# Patient Record
Sex: MALE | Race: WHITE | NOT HISPANIC OR LATINO
[De-identification: names, ages, dates, MRNs, and addresses within clinical notes are randomized per-mention and may not be internally consistent; named-entity substitution may affect disease eponyms.]

---

## 2022-05-29 ENCOUNTER — HEALTH MAINTENANCE LETTER (OUTPATIENT)
Age: 32
End: 2022-05-29

## 2022-06-09 ENCOUNTER — OFFICE VISIT (OUTPATIENT)
Dept: URGENT CARE | Facility: URGENT CARE | Age: 32
End: 2022-06-09
Payer: COMMERCIAL

## 2022-06-09 ENCOUNTER — NURSE TRIAGE (OUTPATIENT)
Dept: NURSING | Facility: CLINIC | Age: 32
End: 2022-06-09
Payer: COMMERCIAL

## 2022-06-09 VITALS
SYSTOLIC BLOOD PRESSURE: 119 MMHG | DIASTOLIC BLOOD PRESSURE: 80 MMHG | RESPIRATION RATE: 16 BRPM | WEIGHT: 190 LBS | OXYGEN SATURATION: 100 % | HEART RATE: 63 BPM

## 2022-06-09 DIAGNOSIS — R21 RASH OF HAND: Primary | ICD-10-CM

## 2022-06-09 PROCEDURE — 99203 OFFICE O/P NEW LOW 30 MIN: CPT | Performed by: NURSE PRACTITIONER

## 2022-06-09 RX ORDER — DOXYCYCLINE 100 MG/1
100 TABLET ORAL 2 TIMES DAILY
Qty: 20 TABLET | Refills: 0 | Status: SHIPPED | OUTPATIENT
Start: 2022-06-09 | End: 2022-06-19

## 2022-06-09 RX ORDER — TRIAMCINOLONE ACETONIDE 1 MG/G
CREAM TOPICAL 3 TIMES DAILY
Qty: 15 G | Refills: 0 | Status: SHIPPED | OUTPATIENT
Start: 2022-06-09 | End: 2022-06-16

## 2022-06-09 NOTE — TELEPHONE ENCOUNTER
Patient is calling and has an irritation on top of his hand rash is present on top of right hand.  Cracks and bumps present and is getting worse.  Rash started one month ago.  Almost went away and then comes back.  Size of silver dollar.  Patient denies fever cough and shortness of breath.  Patient states that he will go to walk in clinic today.  Denies that rash is very painful.  Denies purple in color.  Rash has been present greater than 7 days.    COVID 19 Nurse Triage Plan/Patient Instructions    Please be aware that novel coronavirus (COVID-19) may be circulating in the community. If you develop symptoms such as fever, cough, or SOB or if you have concerns about the presence of another infection including coronavirus (COVID-19), please contact your health care provider or visit [email or web address].     Disposition/Instructions    In-Person Visit with provider recommended. Reference Visit Selection Guide.    Thank you for taking steps to prevent the spread of this virus.  o Limit your contact with others.  o Wear a simple mask to cover your cough.  o Wash your hands well and often.    Resources    [facility]: About COVID-19: [email or web address]    CDC: What to Do If You're Sick: www.cdc.gov/coronavirus/2019-ncov/about/steps-when-sick.html    CDC: Ending Home Isolation: www.cdc.gov/coronavirus/2019-ncov/hcp/disposition-in-home-patients.html     CDC: Caring for Someone: www.cdc.gov/coronavirus/2019-ncov/if-you-are-sick/care-for-someone.html     [facility]: Interim Guidance for Hospital Discharge to Home: www.health.[facility].mn.us/diseases/coronavirus/hcp/hospdischarge.pdf    [facility] clinical trials (COVID-19 research studies): [email or web address].[email or web address].edu/um-clinical-trials     Below are the COVID-19 hotlines at the [place] Department of [facility] ([facility]). Interpreters are available.   o For health questions: Call 789-772-1791 or 1-514.116.5675 (7 a.m. to 7 p.m.)  o For questions about  schools and childcare: Call 389-411-4065 or 1-465.393.8772 (7 a.m. to 7 p.m.)                       Reason for Disposition    Localized rash present > 7 days    Additional Information    Negative: Sounds like a life-threatening emergency to the triager    Negative: Fever and localized purple or blood-colored spots or dots that are not from injury or friction    Negative: Fever and localized rash is very painful    Negative: Patient sounds very sick or weak to the triager    Negative: Looks like a boil, infected sore, deep ulcer, or other infected rash (spreading redness, pus)    Negative: Painful rash with multiple small blisters grouped together (i.e., dermatomal distribution or 'band' or 'stripe')    Negative: Localized rash is very painful (no fever)    Negative: Localized purple or blood-colored spots or dots that are not from injury or friction (no fever)    Negative: Lyme disease suspected (e.g., bull's-eye rash or tick bite / exposure)    Negative: Patient wants to be seen    Negative: Tender bumps in armpits    Negative: Pimples (localized) and no improvement after using CARE ADVICE    Negative: SEVERE local itching persists after 2 days of steroid cream    Negative: Applying cream or ointment and it causes severe itch, burning, or pain    Protocols used: RASH OR REDNESS - [id number]

## 2022-06-09 NOTE — PROGRESS NOTES
Chief Complaint   Patient presents with     Rash     Pt has a rash on R hand that has been going on for 1 month          ICD-10-CM    1. Rash of hand  R21 doxycycline monohydrate (ADOXA) 100 MG tablet     triamcinolone (KENALOG) 0.1 % external cream     Adult Dermatology Referral   Some concerns about secondary MRSA infection, will treat with doxycycline for this.  We will try prescription steroid on the rash itself and see if this helps to improve symptoms.  Dermatology referral is made if symptoms do not improve with this course of action.      Subjective     Corey Martin is an 32 year old male who presents to clinic today for rash for approximately 1 month on the right hand.  Started out as a few red dots and they eventually became bigger and merged into this larger area.  He has tried over-the-counter hydrocortisone cream and the symptoms nearly all gone but then they started worsening again and now he has slight yellow crusting on some of the areas.  He has felt well otherwise, no fever no upper respiratory symptoms, no exposure to woods or plants that he can recall.        Objective    /80   Pulse 63   Resp 16   Wt 86.2 kg (190 lb)   SpO2 100%   Nurses notes and VS have been reviewed.    Physical Exam     Alert and oriented  Right hand has 2.5 cm diameter rash that appears in a somewhat reticular pattern with several small areas of yellow crusting, rash is raised slightly off of the skin and the entire base of the area has become quite red    Patient Instructions   Do not use Triamcinolone cream longer than 10 days      GABRIEL Steven, CNP  [facility] Provider    The use of Dragon/[employer] dictation services may have been used to construct the content in this note; any grammatical or spelling errors are non-intentional. Please contact the author of this note directly if you are in need of any clarification.

## 2022-06-22 NOTE — PROGRESS NOTES
SUBJECTIVE:   CC: Corey Martni is an 32 year old male who presents for preventative health visit.       Patient has been advised of split billing requirements and indicates understanding: Yes  Healthy Habits:     Getting at least 3 servings of Calcium per day:  Yes    Bi-annual eye exam:  Yes    Dental care twice a year:  NO    Sleep apnea or symptoms of sleep apnea:  Excessive snoring    Diet:  Regular (no restrictions)    Frequency of exercise:  2-3 days/week    Duration of exercise:  15-30 minutes    Taking medications regularly:  Not Applicable    Medication side effects:  Not applicable    PHQ-2 Total Score: 0    Additional concerns today:  Yes        Today's PHQ-2 Score:   PHQ-2 ( 1999 Pfizer) 6/23/2022   Q1: Little interest or pleasure in doing things 0   Q2: Feeling down, depressed or hopeless 0   PHQ-2 Score 0   Q1: Little interest or pleasure in doing things Not at all   Q2: Feeling down, depressed or hopeless Not at all   PHQ-2 Score 0       Abuse: Current or Past(Physical, Sexual or Emotional)- No  Do you feel safe in your environment? Yes    Have you ever done Advance Care Planning? (For example, a Health Directive, POLST, or a discussion with a medical provider or your loved ones about your wishes): No, advance care planning information given to patient to review.  Patient plans to discuss their wishes with loved ones or provider.      Social History     Tobacco Use     Smoking status: Passive Smoke Exposure - Never Smoker     Smokeless tobacco: Current User     Tobacco comment: Use chew tobacco a few times a week for the last ten years. been trying to quit.   Substance Use Topics     Alcohol use: Never     If you drink alcohol do you typically have >3 drinks per day or >7 drinks per week? No    Alcohol Use 6/23/2022   Prescreen: >3 drinks/day or >7 drinks/week? Not Applicable   Prescreen: >3 drinks/day or >7 drinks/week? -       Last PSA: No results found for: PSA    Reviewed orders with patient.  Reviewed health maintenance and updated orders accordingly - Yes  There is no problem list on file for this patient.    Past Surgical History:   Procedure Laterality Date     GENITOURINARY SURGERY  December, 2020    Vasectomy       Social History     Tobacco Use     Smoking status: Passive Smoke Exposure - Never Smoker     Smokeless tobacco: Current User     Tobacco comment: Use chew tobacco a few times a week for the last ten years. been trying to quit.   Substance Use Topics     Alcohol use: Never     Family History   Problem Relation Age of Onset     Diabetes Father      Hypertension Father      Other Cancer Father      Hypertension Sister          Current Outpatient Medications   Medication Sig Dispense Refill     clobetasol (TEMOVATE) 0.05 % external cream Apply topically 2 times daily 60 g 11     Allergies   Allergen Reactions     Sulfamethoxazole W/Trimethoprim      Sulfamethoxazole-Trimethoprim Other (See Comments)       Reviewed and updated as needed this visit by clinical staff   Tobacco   Meds  Problems  Med Hx  Surg Hx  Fam Hx            Reviewed and updated as needed this visit by Provider   Tobacco     Med Hx  Surg Hx  Fam Hx           History reviewed. No pertinent past medical history.   Past Surgical History:   Procedure Laterality Date     GENITOURINARY SURGERY  December, 2020    Vasectomy       Review of Systems   Constitutional: Negative for chills and fever.   HENT: Negative for congestion, ear pain, hearing loss and sore throat.    Eyes: Negative for pain and visual disturbance.   Respiratory: Negative for cough and shortness of breath.    Cardiovascular: Negative for chest pain, palpitations and peripheral edema.   Gastrointestinal: Negative for abdominal pain, constipation, diarrhea, heartburn, hematochezia and nausea.   Genitourinary: Negative for dysuria, frequency, genital sores, hematuria, impotence, penile discharge and urgency.   Musculoskeletal: Negative for arthralgias,  "joint swelling and myalgias.   Skin: Positive for rash.   Neurological: Negative for dizziness, weakness, headaches and paresthesias.   Psychiatric/Behavioral: Negative for mood changes. The patient is not nervous/anxious.    rash on dorsum on right hand treated with antibiotics and triamcinolone cream, but persistent albeit improved     OBJECTIVE:   /77 (BP Location: Left arm, Patient Position: Chair, Cuff Size: Adult Large)   Pulse 76   Temp 97  F (36.1  C) (Tympanic)   Resp 16   Ht 1.689 m (5' 6.5\")   Wt 85.7 kg (189 lb)   SpO2 97%   BMI 30.05 kg/m      Physical Exam  GENERAL: healthy, alert and no distress  EYES: Eyes grossly normal to inspection, PERRL and conjunctivae and sclerae normal  HENT: ear canals and TM's normal, nose and mouth without ulcers or lesions  NECK: no adenopathy, no asymmetry, masses, or scars and thyroid normal to palpation  RESP: lungs clear to auscultation - no rales, rhonchi or wheezes  CV: regular rate and rhythm, normal S1 S2, no S3 or S4, no murmur, click or rub, no peripheral edema and peripheral pulses strong  ABDOMEN: soft, nontender, no hepatosplenomegaly, no masses and bowel sounds normal   (male): normal male genitalia without lesions or urethral discharge, no hernia  MS: no gross musculoskeletal defects noted, no edema  SKIN: erythema on dorsum of right hand   NEURO: Normal strength and tone, mentation intact and speech normal  PSYCH: mentation appears normal, affect normal/bright    Labs pending     ASSESSMENT/PLAN:       ICD-10-CM    1. Routine general medical examination at a health care facility  Z00.00 Lipid panel reflex to direct LDL Fasting     Comprehensive metabolic panel     CBC with platelets   2. Eczema, unspecified type  L30.9 clobetasol (TEMOVATE) 0.05 % external cream   3. Tobacco abuse  Z72.0    4. Screening for HIV (human immunodeficiency virus)  Z11.4 HIV Antigen Antibody Combo   5. Need for hepatitis C screening test  Z11.59 Hepatitis C Screen " "Reflex to HCV RNA Quant and Genotype   6. Need for vaccination  Z23 TD PRSERV FREE >=7 YRS ADS IM [[id number]]     Counseled on quitting chew  Counseled on strategies for weight loss  Rash looks eczematous rather than infectious; recommend higher potency steroid cream ; return if that does not clear the rash up   Patient has been advised of split billing requirements and indicates understanding: Yes    COUNSELING:   Reviewed preventive health counseling, as reflected in patient instructions  Special attention given to:        Regular exercise       Healthy diet/nutrition       Immunizations    Tetanus booster today              Consider Hep C screening for all patients one time for ages 18-79 years       HIV screeninx in teen years, 1x in adult years, and at intervals if high risk    Estimated body mass index is 30.05 kg/m  as calculated from the following:    Height as of this encounter: 1.689 m (5' 6.5\").    Weight as of this encounter: 85.7 kg (189 lb).     Weight management plan: Discussed healthy diet and exercise guidelines    He reports that he is a non-smoker but has been exposed to tobacco smoke. The exposure started about 9 years ago. He has been exposed to 0.00 packs per day for the past 10.00 years. He uses smokeless tobacco.      Counseling Resources:  ATP IV Guidelines  Pooled Cohorts Equation Calculator  FRAX Risk Assessment  ICSI Preventive Guidelines  Dietary Guidelines for Americans,   USDA's MyPlate  ASA Prophylaxis  Lung CA Screening    King Batres MD  [facility]  "

## 2022-06-23 ENCOUNTER — OFFICE VISIT (OUTPATIENT)
Dept: FAMILY MEDICINE | Facility: CLINIC | Age: 32
End: 2022-06-23
Payer: COMMERCIAL

## 2022-06-23 VITALS
DIASTOLIC BLOOD PRESSURE: 77 MMHG | BODY MASS INDEX: 29.66 KG/M2 | OXYGEN SATURATION: 97 % | RESPIRATION RATE: 16 BRPM | SYSTOLIC BLOOD PRESSURE: 130 MMHG | HEIGHT: 67 IN | TEMPERATURE: 97 F | WEIGHT: 189 LBS | HEART RATE: 76 BPM

## 2022-06-23 DIAGNOSIS — Z00.00 ROUTINE GENERAL MEDICAL EXAMINATION AT A HEALTH CARE FACILITY: Primary | ICD-10-CM

## 2022-06-23 DIAGNOSIS — L30.9 ECZEMA, UNSPECIFIED TYPE: ICD-10-CM

## 2022-06-23 DIAGNOSIS — Z11.59 NEED FOR HEPATITIS C SCREENING TEST: ICD-10-CM

## 2022-06-23 DIAGNOSIS — Z11.4 SCREENING FOR HIV (HUMAN IMMUNODEFICIENCY VIRUS): ICD-10-CM

## 2022-06-23 DIAGNOSIS — Z23 NEED FOR VACCINATION: ICD-10-CM

## 2022-06-23 DIAGNOSIS — Z72.0 TOBACCO ABUSE: ICD-10-CM

## 2022-06-23 PROCEDURE — 90714 TD VACC NO PRESV 7 YRS+ IM: CPT | Performed by: INTERNAL MEDICINE

## 2022-06-23 PROCEDURE — 90471 IMMUNIZATION ADMIN: CPT | Performed by: INTERNAL MEDICINE

## 2022-06-23 PROCEDURE — 99395 PREV VISIT EST AGE 18-39: CPT | Mod: 25 | Performed by: INTERNAL MEDICINE

## 2022-06-23 RX ORDER — CLOBETASOL PROPIONATE 0.5 MG/G
CREAM TOPICAL 2 TIMES DAILY
Qty: 60 G | Refills: 11 | Status: SHIPPED | OUTPATIENT
Start: 2022-06-23

## 2022-06-23 ASSESSMENT — ENCOUNTER SYMPTOMS
WEAKNESS: 0
CONSTIPATION: 0
ABDOMINAL PAIN: 0
DYSURIA: 0
ARTHRALGIAS: 0
FEVER: 0
ARTHRALGIAS: 1
PALPITATIONS: 0
CHILLS: 0
FREQUENCY: 0
MYALGIAS: 0
NAUSEA: 0
HEADACHES: 0
SHORTNESS OF BREATH: 0
DIZZINESS: 0
NERVOUS/ANXIOUS: 0
PARESTHESIAS: 0
SORE THROAT: 0
JOINT SWELLING: 0
HEARTBURN: 0
COUGH: 0
HEMATOCHEZIA: 0
DIARRHEA: 0
EYE PAIN: 0
HEMATURIA: 0

## 2022-06-23 ASSESSMENT — PAIN SCALES - GENERAL: PAINLEVEL: NO PAIN (0)

## 2022-06-23 NOTE — NURSING NOTE
Prior to immunization administration, verified patients identity using patient s name and date of birth. Please see Immunization Activity for additional information.     Screening Questionnaire for Adult Immunization    Are you sick today?   No   Do you have allergies to medications, food, a vaccine component or latex?   No   Have you ever had a serious reaction after receiving a vaccination?   No   Do you have a long-term health problem with heart, lung, kidney, or metabolic disease (e.g., diabetes), asthma, a blood disorder, no spleen, complement component deficiency, a cochlear implant, or a spinal fluid leak?  Are you on long-term aspirin therapy?   No   Do you have cancer, leukemia, HIV/AIDS, or any other immune system problem?   No   Do you have a parent, brother, or sister with an immune system problem?   No   In the past 3 months, have you taken medications that affect  your immune system, such as prednisone, other steroids, or anticancer drugs; drugs for the treatment of rheumatoid arthritis, Crohn s disease, or psoriasis; or have you had radiation treatments?   No   Have you had a seizure, or a brain or other nervous system problem?   No   During the past year, have you received a transfusion of blood or blood    products, or been given immune (gamma) globulin or antiviral drug?   No   For women: Are you pregnant or is there a chance you could become       pregnant during the next month?   No   Have you received any vaccinations in the past 4 weeks?   No     Immunization questionnaire answers were all negative.        Per orders of Dr. Batres, injection of TD given by Arianna Lew CMA. Patient instructed to remain in clinic for 15 minutes afterwards, and to report any adverse reaction to me immediately.       Screening performed by Arianna Lwe CMA on 6/23/2022 at 5:13 PM.

## 2022-07-08 ENCOUNTER — LAB (OUTPATIENT)
Dept: LAB | Facility: CLINIC | Age: 32
End: 2022-07-08
Payer: COMMERCIAL

## 2022-07-08 DIAGNOSIS — Z00.00 ROUTINE GENERAL MEDICAL EXAMINATION AT A HEALTH CARE FACILITY: ICD-10-CM

## 2022-07-08 DIAGNOSIS — Z11.4 SCREENING FOR HIV (HUMAN IMMUNODEFICIENCY VIRUS): ICD-10-CM

## 2022-07-08 DIAGNOSIS — Z11.59 NEED FOR HEPATITIS C SCREENING TEST: ICD-10-CM

## 2022-07-08 LAB
ALBUMIN SERPL-MCNC: 4 G/DL (ref 3.4–5)
ALP SERPL-CCNC: 57 U/L (ref 40–150)
ALT SERPL W P-5'-P-CCNC: 40 U/L (ref 0–70)
ANION GAP SERPL CALCULATED.3IONS-SCNC: 5 MMOL/L (ref 3–14)
AST SERPL W P-5'-P-CCNC: 19 U/L (ref 0–45)
BILIRUB SERPL-MCNC: 0.9 MG/DL (ref 0.2–1.3)
BUN SERPL-MCNC: 18 MG/DL (ref 7–30)
CALCIUM SERPL-MCNC: 9.4 MG/DL (ref 8.5–10.1)
CHLORIDE BLD-SCNC: 106 MMOL/L (ref 94–109)
CHOLEST SERPL-MCNC: 202 MG/DL
CO2 SERPL-SCNC: 28 MMOL/L (ref 20–32)
CREAT SERPL-MCNC: 0.9 MG/DL (ref 0.66–1.25)
ERYTHROCYTE [DISTWIDTH] IN BLOOD BY AUTOMATED COUNT: 11.4 % (ref 10–15)
FASTING STATUS PATIENT QL REPORTED: YES
GFR SERPL CREATININE-BSD FRML MDRD: >90 ML/MIN/1.73M2
GLUCOSE BLD-MCNC: 89 MG/DL (ref 70–99)
HCT VFR BLD AUTO: 45.5 % (ref 40–53)
HCV AB SERPL QL IA: NONREACTIVE
HDLC SERPL-MCNC: 56 MG/DL
HGB BLD-MCNC: 15.2 G/DL (ref 13.3–17.7)
HIV 1+2 AB+HIV1 P24 AG SERPL QL IA: NONREACTIVE
LDLC SERPL CALC-MCNC: 129 MG/DL
MCH RBC QN AUTO: 30.7 PG (ref 26.5–33)
MCHC RBC AUTO-ENTMCNC: 33.4 G/DL (ref 31.5–36.5)
MCV RBC AUTO: 92 FL (ref 78–100)
NONHDLC SERPL-MCNC: 146 MG/DL
PLATELET # BLD AUTO: 288 10E3/UL (ref 150–450)
POTASSIUM BLD-SCNC: 4.7 MMOL/L (ref 3.4–5.3)
PROT SERPL-MCNC: 7.8 G/DL (ref 6.8–8.8)
RBC # BLD AUTO: 4.95 10E6/UL (ref 4.4–5.9)
SODIUM SERPL-SCNC: 139 MMOL/L (ref 133–144)
TRIGL SERPL-MCNC: 84 MG/DL
WBC # BLD AUTO: 4.7 10E3/UL (ref 4–11)

## 2022-07-08 PROCEDURE — 86803 HEPATITIS C AB TEST: CPT

## 2022-07-08 PROCEDURE — 85027 COMPLETE CBC AUTOMATED: CPT

## 2022-07-08 PROCEDURE — 80053 COMPREHEN METABOLIC PANEL: CPT

## 2022-07-08 PROCEDURE — 80061 LIPID PANEL: CPT

## 2022-07-08 PROCEDURE — 87389 HIV-1 AG W/HIV-1&-2 AB AG IA: CPT

## 2022-07-08 PROCEDURE — 36415 COLL VENOUS BLD VENIPUNCTURE: CPT

## 2022-07-08 NOTE — RESULT ENCOUNTER NOTE
"The following letter pertains to your most recent diagnostic tests:    -Your HIV test is negative.     -Your hepatitis C screening test is negative.  You do not have hepatitis C.     -Your total cholesterol is 202 which is just above your goal of total cholesterol less than 200.    -Your triglycerides are 84 which are at your goal of triglycerides less than 150.    -Your HDL or \"good cholesterol\" is 56 which is at your goal of HDL cholesterol greater than 40.    -Your LDL cholesterol or \"bad cholesterol\" is 129 which is at your goal of LDL cholesterol less than <160.  Your LDL goal is based on your risk factors for artery disease.      -Liver and gallbladder tests are normal for you. (ALT,AST, Alk phos, bilirubin), kidney function is normal for you (Creatinine, GFR), Sodium is normal, Potassium is normal for you, Calcium is normal for you, Glucose (blood sugar) is normal for you.      -Your complete blood counts including your hemoglobin returned normal for you.           Bottom line:  Overall, these results look healthy.  You can improve your cholesterol by watching your diet.  You can reduce your total and LDL cholesterol levels by eating less saturated fats.  This means you should eat less fried foods and meat.  If you eat meat, you should try to eat more chicken and fish and less fatty beef or pork.  Also, you should increase dietary fiber intake by eating more fruits, vegetables, nuts and whole grains.  A diet high in fiber reduces total and LDL cholesterol levels.  There is no need for cholesterol medications based on these results.  We can recheck in 2-3 years.            Sincerely,    Dr. Batres"

## 2022-07-08 NOTE — LETTER
"July 8, 2022      Corey Martin  [address] [address] [facility] [address]        Dear ,    We are writing to inform you of your test results.    The following letter pertains to your most recent diagnostic tests:     -Your HIV test is negative.     -Your hepatitis C screening test is negative.  You do not have hepatitis C.     -Your total cholesterol is 202 which is just above your goal of total cholesterol less than 200.     -Your triglycerides are 84 which are at your goal of triglycerides less than 150.     -Your HDL or \"good cholesterol\" is 56 which is at your goal of HDL cholesterol greater than 40.     -Your LDL cholesterol or \"bad cholesterol\" is 129 which is at your goal of LDL cholesterol less than <160.  Your LDL goal is based on your risk factors for artery disease.         -Liver and gallbladder tests are normal for you. (ALT,AST, Alk phos, bilirubin), kidney function is normal for you (Creatinine, GFR), Sodium is normal, Potassium is normal for you, Calcium is normal for you, Glucose (blood sugar) is normal for you.       -Your complete blood counts including your hemoglobin returned normal for you.             Bottom line:  Overall, these results look healthy.  You can improve your cholesterol by watching your diet.  You can reduce your total and LDL cholesterol levels by eating less saturated fats.  This means you should eat less fried foods and meat.  If you eat meat, you should try to eat more chicken and fish and less fatty beef or pork.  Also, you should increase dietary fiber intake by eating more fruits, vegetables, nuts and whole grains.  A diet high in fiber reduces total and LDL cholesterol levels.  There is no need for cholesterol medications based on these results.  We can recheck in 2-3 years.          Resulted Orders   HIV Antigen Antibody Combo   Result Value Ref Range    HIV Antigen Antibody Combo Nonreactive Nonreactive      Comment:      HIV-1 p24 Ag & HIV-1/HIV-2 Ab Not Detected "   Hepatitis C Screen Reflex to HCV RNA Quant and Genotype   Result Value Ref Range    Hepatitis C Antibody Nonreactive Nonreactive    Narrative    Assay performance characteristics have not been established for newborns, infants, and children.   Lipid panel reflex to direct LDL Fasting   Result Value Ref Range    Cholesterol 202 (H) <200 mg/dL    Triglycerides 84 <150 mg/dL    Direct Measure HDL 56 >=40 mg/dL    LDL Cholesterol Calculated 129 (H) <=100 mg/dL    Non HDL Cholesterol 146 (H) <130 mg/dL    Patient Fasting > 8hrs? Yes     Narrative    Cholesterol  Desirable:  <200 mg/dL    Triglycerides  Normal:  Less than 150 mg/dL  Borderline High:  150-199 mg/dL  High:  200-499 mg/dL  Very High:  Greater than or equal to 500 mg/dL    Direct Measure HDL  Female:  Greater than or equal to 50 mg/dL   Male:  Greater than or equal to 40 mg/dL    LDL Cholesterol  Desirable:  <100mg/dL  Above Desirable:  100-129 mg/dL   Borderline High:  130-159 mg/dL   High:  160-189 mg/dL   Very High:  >= 190 mg/dL    Non HDL Cholesterol  Desirable:  130 mg/dL  Above Desirable:  130-159 mg/dL  Borderline High:  160-189 mg/dL  High:  190-219 mg/dL  Very High:  Greater than or equal to 220 mg/dL   Comprehensive metabolic panel   Result Value Ref Range    Sodium 139 133 - 144 mmol/L    Potassium 4.7 3.4 - 5.3 mmol/L    Chloride 106 94 - 109 mmol/L    Carbon Dioxide (CO2) 28 20 - 32 mmol/L    Anion Gap 5 3 - 14 mmol/L    Urea Nitrogen 18 7 - 30 mg/dL    Creatinine 0.90 0.66 - 1.25 mg/dL    Calcium 9.4 8.5 - 10.1 mg/dL    Glucose 89 70 - 99 mg/dL    Alkaline Phosphatase 57 40 - 150 U/L    AST 19 0 - 45 U/L    ALT 40 0 - 70 U/L    Protein Total 7.8 6.8 - 8.8 g/dL    Albumin 4.0 3.4 - 5.0 g/dL    Bilirubin Total 0.9 0.2 - 1.3 mg/dL    GFR Estimate >90 >60 mL/min/1.73m2      Comment:      Effective December 21, 2021 eGFRcr in adults is calculated using the 2021 CKD-EPI creatinine equation which includes age and gender (Kaushal gardner al., [place], DOI:  10.1056/DGYIdb5674429)   CBC with platelets   Result Value Ref Range    WBC Count 4.7 4.0 - 11.0 10e3/uL    RBC Count 4.95 4.40 - 5.90 10e6/uL    Hemoglobin 15.2 13.3 - 17.7 g/dL    Hematocrit 45.5 40.0 - 53.0 %    MCV 92 78 - 100 fL    MCH 30.7 26.5 - 33.0 pg    MCHC 33.4 31.5 - 36.5 g/dL    RDW 11.4 10.0 - 15.0 %    Platelet Count 288 150 - 450 10e3/uL       If you have any questions or concerns, please call the clinic at the number listed above.       Sincerely,      King Batres MD

## 2022-10-03 ENCOUNTER — HEALTH MAINTENANCE LETTER (OUTPATIENT)
Age: 32
End: 2022-10-03

## 2023-06-10 ENCOUNTER — OFFICE VISIT (OUTPATIENT)
Dept: URGENT CARE | Facility: URGENT CARE | Age: 33
End: 2023-06-10
Payer: COMMERCIAL

## 2023-06-10 VITALS
WEIGHT: 194 LBS | OXYGEN SATURATION: 100 % | SYSTOLIC BLOOD PRESSURE: 135 MMHG | BODY MASS INDEX: 30.84 KG/M2 | DIASTOLIC BLOOD PRESSURE: 82 MMHG | HEART RATE: 78 BPM | TEMPERATURE: 97.7 F

## 2023-06-10 DIAGNOSIS — K40.90 UNILATERAL INGUINAL HERNIA WITHOUT OBSTRUCTION OR GANGRENE, RECURRENCE NOT SPECIFIED: ICD-10-CM

## 2023-06-10 DIAGNOSIS — R10.31 GROIN PAIN, RIGHT: Primary | ICD-10-CM

## 2023-06-10 DIAGNOSIS — M54.50 ACUTE RIGHT-SIDED LOW BACK PAIN WITHOUT SCIATICA: ICD-10-CM

## 2023-06-10 LAB
ALBUMIN UR-MCNC: NEGATIVE MG/DL
APPEARANCE UR: CLEAR
BACTERIA #/AREA URNS HPF: NORMAL /HPF
BILIRUB UR QL STRIP: NEGATIVE
COLOR UR AUTO: YELLOW
GLUCOSE UR STRIP-MCNC: NEGATIVE MG/DL
HGB UR QL STRIP: NEGATIVE
KETONES UR STRIP-MCNC: NEGATIVE MG/DL
LEUKOCYTE ESTERASE UR QL STRIP: NEGATIVE
NITRATE UR QL: NEGATIVE
PH UR STRIP: 7.5 [PH] (ref 5–7)
RBC #/AREA URNS AUTO: NORMAL /HPF
SP GR UR STRIP: 1.01 (ref 1–1.03)
UROBILINOGEN UR STRIP-ACNC: 0.2 E.U./DL
WBC #/AREA URNS AUTO: NORMAL /HPF

## 2023-06-10 PROCEDURE — 81001 URINALYSIS AUTO W/SCOPE: CPT | Performed by: FAMILY MEDICINE

## 2023-06-10 PROCEDURE — 99213 OFFICE O/P EST LOW 20 MIN: CPT | Performed by: FAMILY MEDICINE

## 2023-06-10 PROCEDURE — 87491 CHLMYD TRACH DNA AMP PROBE: CPT | Performed by: FAMILY MEDICINE

## 2023-06-10 PROCEDURE — 87591 N.GONORRHOEAE DNA AMP PROB: CPT | Performed by: FAMILY MEDICINE

## 2023-06-12 LAB
C TRACH DNA SPEC QL NAA+PROBE: NEGATIVE
N GONORRHOEA DNA SPEC QL NAA+PROBE: NEGATIVE

## 2023-06-15 ENCOUNTER — OFFICE VISIT (OUTPATIENT)
Dept: PEDIATRICS | Facility: CLINIC | Age: 33
End: 2023-06-15
Payer: COMMERCIAL

## 2023-06-15 ENCOUNTER — ANCILLARY PROCEDURE (OUTPATIENT)
Dept: ULTRASOUND IMAGING | Facility: CLINIC | Age: 33
End: 2023-06-15
Attending: PREVENTIVE MEDICINE
Payer: COMMERCIAL

## 2023-06-15 ENCOUNTER — NURSE TRIAGE (OUTPATIENT)
Dept: INTERNAL MEDICINE | Facility: CLINIC | Age: 33
End: 2023-06-15
Payer: COMMERCIAL

## 2023-06-15 VITALS
SYSTOLIC BLOOD PRESSURE: 142 MMHG | OXYGEN SATURATION: 98 % | WEIGHT: 193 LBS | TEMPERATURE: 98 F | DIASTOLIC BLOOD PRESSURE: 92 MMHG | BODY MASS INDEX: 30.68 KG/M2 | HEART RATE: 69 BPM

## 2023-06-15 DIAGNOSIS — R10.31 GROIN PAIN, RIGHT: Primary | ICD-10-CM

## 2023-06-15 DIAGNOSIS — R10.31 GROIN PAIN, RIGHT: ICD-10-CM

## 2023-06-15 PROCEDURE — 76870 US EXAM SCROTUM: CPT

## 2023-06-15 PROCEDURE — 99215 OFFICE O/P EST HI 40 MIN: CPT | Performed by: PREVENTIVE MEDICINE

## 2023-06-15 PROCEDURE — 99417 PROLNG OP E/M EACH 15 MIN: CPT | Performed by: PREVENTIVE MEDICINE

## 2023-06-15 NOTE — PROGRESS NOTES
"  Assessment & Plan     (R10.31) Groin pain, right  (primary encounter diagnosis)  Plan: US Testicular & Scrotum w Doppler Ltd    Most consistent with right inguinal hernia  Advised to keep referral with general surgery for next week      91 minutes spent by me on the date of the encounter doing chart review, review of outside records, review of test results, interpretation of tests, patient visit, documentation, discussion with other provider(s) and discussion with family        BMI:   Estimated body mass index is 30.68 kg/m  as calculated from the following:    Height as of 6/23/22: 1.689 m (5' 6.5\").    Weight as of this encounter: 87.5 kg (193 lb).       See Patient Instructions    No follow-ups on file.    Doc Pollard MD  [facility] Nicole is a 33 year old, presenting for the following health issues:  Groin Pain         View : No data to display.              HPI     Testicular Pain  Onset/Duration: last Saturday 6/10  Urinary symptoms:   Dysuria (painful urination): No  Hematuria (blood in urine): No  Frequency: No  Waking at night to urinate : No  Hesitancy (delay in urine): YES- maybe a little  Retention (unable to empty): YES- half hour later I feel like I have to use the bathroom again  Decrease in urinary flow: YES- maybe a little  Incontinence: No  Progression of Symptoms:  worsening  Accompanying Signs & Symptoms:  Fever: No  Back/Flank pain: YES - jolting feeling in R flank  Urethral discharge: No  Testicle lumps/masses: No  Nausea and/or vomiting: No  Abdominal pain: YES- RLQ/pelvic region  History:   Sexually active: YES- one partner  Precipitating or alleviating factors: None  Therapies tried and outcome:  none      Review of Systems   Constitutional, HEENT, cardiovascular, pulmonary, GI, , musculoskeletal, neuro, skin, endocrine and psych systems are negative, except as otherwise noted.      Objective    BP (!) 142/92 (BP Location: " Left arm, Patient Position: Sitting, Cuff Size: Adult Regular)   Pulse 69   Temp 98  F (36.7  C) (Oral)   Wt 87.5 kg (193 lb)   SpO2 98%   BMI 30.68 kg/m    Body mass index is 30.68 kg/m .  Physical Exam   GENERAL: healthy, alert and no distress  EYES: Eyes grossly normal to inspection, PERRL and conjunctivae and sclerae normal  HENT: ear canals and TM's normal, nose and mouth without ulcers or lesions  NECK: no adenopathy, no asymmetry, masses, or scars and thyroid normal to palpation  RESP: lungs clear to auscultation - no rales, rhonchi or wheezes  CV: regular rate and rhythm, normal S1 S2, no S3 or S4, no murmur, click or rub, no peripheral edema and peripheral pulses strong  ABDOMEN: soft, nontender, no hepatosplenomegaly, no masses and bowel sounds normal  MS: no gross musculoskeletal defects noted, no edema  SKIN: no suspicious lesions or rashes  NEURO: Normal strength and tone, mentation intact and speech normal  PSYCH: mentation appears normal, affect normal/bright   - palpable r inguinal fullness on valsalva, minimally ttp, otherwise normal testicular exam  Neg slr b  Nl internal and external rotation of hips bilaterally  Nl cms b legs    Results for orders placed or performed in visit on 06/15/23   US Testicular & Scrotum w Doppler Ltd     Status: None    Narrative    EXAM: US TESTICULAR AND SCROTUM WITH DOPPLER LIMITED  LOCATION: [facility]  DATE: 6/15/2023    INDICATION: Right scrotal pain and fullness, ?hernia, r o torsion.  COMPARISON: None.  TECHNIQUE: Ultrasound of scrotum with color flow and spectral Doppler with waveform analysis performed.    FINDINGS:    RIGHT: Right testicle measures 5.3 x 3.5 x 2.5 cm. Normal testicle with no masses. Normal arterial duplex and normal color flow. There are two exophytic epididymal head cysts measuring 0.5 x 0.5 x 0.4 and 0.3 x 0.3 x 0.2 cm. No hydrocele. No varicocele.    LEFT: Left testicle measures 5.3 x 3.4 x 2.2 cm. Normal  testicle with no masses. Normal arterial duplex and normal color flow. There is 0.5 x 0.4 x 0.3 cm epididymal head cyst. No hydrocele. No varicocele.    No inguinal hernia visualized.      Impression    IMPRESSION:  1.  Bilateral epididymal head cysts, otherwise unremarkable scrotal ultrasound.

## 2023-06-15 NOTE — TELEPHONE ENCOUNTER
"Pt complains of constant groin pain since Saturday. Pain is on scrotal area and \"it feels like the right testicle is being squeezed\". No bulging is noticed.  He was seen at  and was referred to general surgeon for consult of possible hernia. Pt called to schedule an appt with surgeon, but would like to have imaging done. Pt asked if  provider can add imaging order to general surgeon referral. Triage huddled with [facility] provider. MD advised pt can see [facility] today. Pt was informed of advice. He agreed to go to [facility] this morning.    Reason for Disposition    MODERATE-SEVERE pain in penis, scrotum, or testicle lasts > 1 hour    Additional Information    Negative: Major bleeding (actively dripping or spurting) that can't be stopped    Negative: Shock suspected (e.g., cold/pale/clammy skin, too weak to stand, low BP, rapid pulse)    Negative: Amputation of penis    Negative: Sounds like a life-threatening emergency to the triager    Negative: Pulled muscle in thigh or groin    Negative: Wound looks infected    Protocols used: GENITAL INJURY - MALE-A-OH      "

## 2023-06-22 ENCOUNTER — OFFICE VISIT (OUTPATIENT)
Dept: SURGERY | Facility: CLINIC | Age: 33
End: 2023-06-22
Payer: COMMERCIAL

## 2023-06-22 VITALS
SYSTOLIC BLOOD PRESSURE: 120 MMHG | HEART RATE: 64 BPM | WEIGHT: 193 LBS | DIASTOLIC BLOOD PRESSURE: 80 MMHG | HEIGHT: 67 IN | BODY MASS INDEX: 30.29 KG/M2

## 2023-06-22 DIAGNOSIS — R10.31 RIGHT INGUINAL PAIN: Primary | ICD-10-CM

## 2023-06-22 PROCEDURE — 99203 OFFICE O/P NEW LOW 30 MIN: CPT | Performed by: SURGERY

## 2023-06-22 NOTE — PROGRESS NOTES
"[facility]  Surgery Consultation    CONSULTATION REQUESTED BY:  Jack Amaya DO    HPI: This patient is a 33-year-old gentleman referred by the above provider for consultation regarding possible right inguinal hernia.  He states that he began to experience some right-sided groin pain approximately 2 weeks ago.  He did not suffer any specific injury but reports that he works a relatively physically demanding job.  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  He started out as having some right-sided testicular discomfort and groin pain.  He would have this somewhat episodically during the day.  He has had no GI dysfunction.  He noted no bulges.  He ultimately was evaluated and did go for an ultrasound which demonstrated no evidence of hernia but some epididymal cysts.  He reports that in the interval since onset of symptoms his symptoms have improved.  There he has had no work disruption he is doing his normal activities and in spite of this he has continued to have improvement in symptoms.  No pain on coughing or sneezing.  No GI or bowel obstruction symptoms.    PMH:   has no past medical history on file.  PSH:    has a past surgical history that includes Abdomen surgery (December, 2020).  Social History:   reports that he has never smoked. He has been exposed to tobacco smoke. He uses smokeless tobacco. He reports that he does not drink alcohol and does not use drugs.  Family History:  family history includes Diabetes in his father; Hypertension in his father and sister; Other Cancer in his father.  Medications/Allergies: Home medications and allergies reviewed.    ROS:  The 10 point Review of Systems is negative other than noted in the HPI.    Physical Exam:  /80   Pulse 64   Ht 1.689 m (5' 6.5\")   Wt 87.5 kg (193 lb)   BMI 30.68 kg/m    GENERAL: Generally appears well.  Psych: Alert and Oriented.  Normal affect  Eyes: Sclera " clear  Respiratory:  Lungs clear to ausculation bilaterally with good air excursion  Cardiovascular:  Regular Rate and Rhythm with no murmurs gallops or rubs, normal peripheral pulses  GI: Abdomen Non Distended Soft Non-Tender  No hernias palpated..  Groin- I examined the patient in both the standing and supine positions. Right Groin- No hernia Palpated.  Indicated area of discomfort seems to be over the pubic tubercle although this is not particularly tender to palpation.  Left Groin- No hernia Palpated. No scrotal or testicle abnormalities.  Lymphatic/Hematologic/Immune:  No femoral or cervical lymphadenopathy.  Integumentary:  No rashes  Neurological: grossly intact     All new lab and imaging data was reviewed.     Impression and Plan:  Patient is a 33 year old male with right groin/inguinal pain, no evidence of hernia.  Improving    PLAN: No intervention planned at this time.  Certainly could be a small hernia that is not reproducible on exam today although it seems more likely some manner of musculoskeletal phenomenon.  Recommended some light stretching continuance of activity.  Return for reevaluation if notices a more defined bulge or lump or worsening of symptoms.    Thank you very much for this consult.    Ashkan Pratt M.D.  [facility]  [phone]    Please route or send letter to:  Primary Care Provider (PCP) and Referring Provider

## 2023-06-23 NOTE — PROGRESS NOTES
SUBJECTIVE: Corey Martin is a 33 year old male presenting with a chief complaint of groin pain rt sided.  Onset of symptoms was day(s) ago.      History reviewed. No pertinent past medical history.  Allergies   Allergen Reactions     Sulfamethoxazole-Trimethoprim      Social History     Tobacco Use     Smoking status: Never     Passive exposure: Yes     Smokeless tobacco: Current     Tobacco comments:     Use chew tobacco a few times a week for the last ten years. been trying to quit.   Substance Use Topics     Alcohol use: Never       ROS:  SKIN: no rash  GI: no vomiting    OBJECTIVE:  /82 (BP Location: Right arm, Patient Position: Chair, Cuff Size: Adult Large)   Pulse 78   Temp 97.7  F (36.5  C) (Tympanic)   Wt 88 kg (194 lb)   SpO2 100%   BMI 30.84 kg/m  GENERAL APPEARANCE: healthy, alert and no distress  ABDOMEN:  soft, nontender, no HSM or masses and bowel sounds normal  SKIN: no suspicious lesions or rashes  Rt sided Low Back pain      ICD-10-CM    1. Groin pain, right  R10.31 UMIC - Urine Micro Only     Adult General Surg Referral      2. Acute right-sided low back pain without sciatica  M54.50 UMIC - Urine Micro Only      3. Unilateral inguinal hernia without obstruction or gangrene, recurrence not specified  K40.90 Adult General Surg Referral          Fluids/Rest, f/u if worse/not any better

## 2023-08-13 ENCOUNTER — HEALTH MAINTENANCE LETTER (OUTPATIENT)
Age: 33
End: 2023-08-13

## 2023-10-31 ENCOUNTER — OFFICE VISIT (OUTPATIENT)
Dept: URGENT CARE | Facility: URGENT CARE | Age: 33
End: 2023-10-31
Payer: COMMERCIAL

## 2023-10-31 VITALS
OXYGEN SATURATION: 99 % | HEART RATE: 65 BPM | DIASTOLIC BLOOD PRESSURE: 72 MMHG | WEIGHT: 192 LBS | TEMPERATURE: 97.1 F | BODY MASS INDEX: 30.53 KG/M2 | SYSTOLIC BLOOD PRESSURE: 122 MMHG | RESPIRATION RATE: 16 BRPM

## 2023-10-31 DIAGNOSIS — Z48.02 VISIT FOR SUTURE REMOVAL: Primary | ICD-10-CM

## 2023-10-31 PROCEDURE — 99213 OFFICE O/P EST LOW 20 MIN: CPT | Performed by: FAMILY MEDICINE

## 2023-10-31 NOTE — PROGRESS NOTES
Corey Martin presents to the clinic today for  removal of sutures.  The patient has had the sutures in place for 10 days.    There has been no history of infection or drainage.    No past medical history on file.    Past Surgical History:   Procedure Laterality Date    GENITOURINARY SURGERY  December, 2020    Vasectomy       Family History   Problem Relation Age of Onset    Diabetes Father     Hypertension Father     Other Cancer Father     Hypertension Sister        Social History     Tobacco Use    Smoking status: Never     Passive exposure: Yes    Smokeless tobacco: Current    Tobacco comments:     Use chew tobacco a few times a week for the last ten years. been trying to quit.   Substance Use Topics    Alcohol use: Never        O: 5 sutures are seen located on the finger by Dr. Jack Amaya.  The wound is healing well with no signs of infection.    Tetanus status is up to date.    A: Suture removal.    P:  All sutures were easily removed today.  Routine wound care discussed.  The patient will follow up as needed.

## 2024-10-06 ENCOUNTER — HEALTH MAINTENANCE LETTER (OUTPATIENT)
Age: 34
End: 2024-10-06

## 2025-04-24 ENCOUNTER — TELEPHONE (OUTPATIENT)
Dept: INTERNAL MEDICINE | Facility: CLINIC | Age: 35
End: 2025-04-24

## 2025-04-24 ENCOUNTER — OFFICE VISIT (OUTPATIENT)
Dept: URGENT CARE | Facility: URGENT CARE | Age: 35
End: 2025-04-24
Payer: COMMERCIAL

## 2025-04-24 VITALS
HEIGHT: 66 IN | HEART RATE: 85 BPM | SYSTOLIC BLOOD PRESSURE: 128 MMHG | DIASTOLIC BLOOD PRESSURE: 84 MMHG | TEMPERATURE: 98.3 F | BODY MASS INDEX: 29.73 KG/M2 | OXYGEN SATURATION: 98 % | RESPIRATION RATE: 16 BRPM | WEIGHT: 185 LBS

## 2025-04-24 DIAGNOSIS — J02.9 SORE THROAT: ICD-10-CM

## 2025-04-24 DIAGNOSIS — J06.9 VIRAL UPPER RESPIRATORY INFECTION: Primary | ICD-10-CM

## 2025-04-24 LAB
DEPRECATED S PYO AG THROAT QL EIA: NEGATIVE
S PYO DNA THROAT QL NAA+PROBE: NOT DETECTED

## 2025-04-24 NOTE — PROGRESS NOTES
"  ICD-10-CM    1. Viral upper respiratory infection  J06.9       2. Sore throat  J02.9 Streptococcus A Rapid Screen w/Reflex to PCR - Clinic Collect     Group A Streptococcus PCR Throat Swab     Adult ENT  Referral      Salt water gargles. Rest.  Fluids.  Tylenol or ibuprofen as needed for fever or pain.  Recheck in 10 days if symptoms have not improved, sooner if they worsen.  Decongestant as needed.  Await results of strep PCR testing.    Red flag warning signs and when to go to the emergency room discussed.  Reviewed potential adverse reactions to medications.    Labs:  Results for orders placed or performed in visit on 04/24/25 (from the past 24 hours)   Streptococcus A Rapid Screen w/Reflex to PCR - Clinic Collect    Specimen: Throat; Swab   Result Value Ref Range    Group A Strep antigen Negative Negative       SUBJECTIVE:   Corey Martin is a 34 year old male presenting with a chief complaint of   Chief Complaint   Patient presents with    Urgent Care     Starting Tuesday morning, Throat pain. Left ear crackling   By Tuesday afternoon, Chills and throat pain.  For the last few months, feeling fine, but then suddenly short of breath and a cough - gone by next day. Side of Neck pain.    Denies - N/V/D       Review of systems is negative except for as noted in the HPI.    OBJECTIVE  /84   Pulse 85   Temp 98.3  F (36.8  C) (Tympanic)   Resp 16   Ht 1.676 m (5' 6\")   Wt 83.9 kg (185 lb)   SpO2 98%   BMI 29.86 kg/m        GENERAL: Alert, mild distress  SKIN: skin is clear, no rash or abnormal pigmentation  HEAD: The head is normocephalic.   EYES: The eyes are normal. The conjunctivae and cornea normal.   NECK: The neck is supple and thyroid is normal, no masses; LYMPH NODES: No adenopathy  HENT: Bilateral tympanic membranes and canals appear normal, nasal passages are clear, pharynx is mildly red  LUNGS: The lung fields are clear to auscultation, no rales, rhonchi, wheezing or retractions  CV: " Rhythm is regular. S1 and S2 are normal. No murmurs.  EXTREMITIES: Symmetric extremities no deformities    GABRIEL Steven, CNP  [facility] Provider    The use of Dragon/Vidiowiki dictation services may have been used to construct the content in this note; any grammatical or spelling errors are non-intentional. Please contact the author of this note directly if you are in need of any clarification.

## 2025-04-24 NOTE — LETTER
April 24, 2025      Corey Martin  [id number] [facility] [id number]        To Whom It May Concern:    Corey Martin  was seen on 04/24/2025.  Please excuse him  until 04/28/2025 due to illness.        Sincerely,        TASHI LOYOLA CNP    Electronically signed

## 2025-04-24 NOTE — TELEPHONE ENCOUNTER
"Pt called the clinic to verify his results.   On MyChart, he read: \"Identity of improperly labeled specimen confirmed by Care Team.\"  Discussed with pt that this does not seem to have any effect on his results.   He will continue to monitor symptoms and return if they do not improve or worsen.     Thank you,  Jessica Jennings RN    "

## 2025-04-24 NOTE — PROGRESS NOTES
Urgent Care Clinic Visit    Chief Complaint   Patient presents with    Urgent Care     Starting Tuesday morning, Throat pain. Left ear crackling   By Tuesday afternoon, Chills and throat pain.  For the last few months, feeling fine, but then suddenly short of breath and a cough - gone by next day. Side of Neck pain.    Denies - N/V/D               4/24/2025    10:24 AM   Additional Questions   Roomed by Joshua Feliz RN   Accompanied by Self         4/24/2025   Forms   Any forms needing to be completed Yes     No  Does the patient have a sore throat and either history of fever >100.4 in the previous 24 hours without a cough or recent exposure to a known case of strep throat? No    Pre-Provider Visit Orders- Influenza  Is this currently Influenza testing season?:  Yes  Does the patient present with a fever and either bodyaches, fatigue, or cough that have been present less than 48 hours? No

## 2025-04-28 ENCOUNTER — PATIENT OUTREACH (OUTPATIENT)
Dept: CARE COORDINATION | Facility: CLINIC | Age: 35
End: 2025-04-28
Payer: COMMERCIAL